# Patient Record
Sex: MALE | URBAN - METROPOLITAN AREA
[De-identification: names, ages, dates, MRNs, and addresses within clinical notes are randomized per-mention and may not be internally consistent; named-entity substitution may affect disease eponyms.]

---

## 2019-07-25 ENCOUNTER — EMERGENCY (EMERGENCY)
Facility: HOSPITAL | Age: 34
LOS: 1 days | Discharge: HOME | End: 2019-07-25
Attending: EMERGENCY MEDICINE
Payer: COMMERCIAL

## 2019-07-25 VITALS
HEIGHT: 70 IN | SYSTOLIC BLOOD PRESSURE: 122 MMHG | OXYGEN SATURATION: 99 % | DIASTOLIC BLOOD PRESSURE: 70 MMHG | HEART RATE: 72 BPM | RESPIRATION RATE: 20 BRPM | WEIGHT: 184.97 LBS | TEMPERATURE: 98 F

## 2019-07-25 LAB
BLOOD GAS SOURCE: SIGNIFICANT CHANGE UP
COHGB MFR BLDV: 2.7 % — HIGH (ref 0–2)
HGB BLD CALC-MCNC: 16.7 G/DL — SIGNIFICANT CHANGE UP (ref 13–17)

## 2019-07-25 PROCEDURE — 36415 COLL VENOUS BLD VENIPUNCTURE: CPT

## 2019-07-25 PROCEDURE — 82375 ASSAY CARBOXYHB QUANT: CPT

## 2019-07-25 PROCEDURE — 99283 EMERGENCY DEPT VISIT LOW MDM: CPT

## 2019-07-25 PROCEDURE — 99284 EMERGENCY DEPT VISIT MOD MDM: CPT

## 2019-07-25 NOTE — ED ADULT NURSE NOTE - OBJECTIVE STATEMENT
assumed pt care at 1345. Pt A&O x4. States he was fixing a smoker by his garage when he started to feel lightheaded, he grabbed carbon monoxide detector which started beeping so came to ED for eval. Pt still c/o dizziness/lightheadedness but denies any other symptoms at this time. No s/s of respiratory distress noted. Safety maintained. Will continue to monitor.

## 2019-07-25 NOTE — ED STATDOCS - CARE PLAN
Principal Discharge DX:	Carbon monoxide exposure major surgery, including arthroscopic and laparoscopic (greater than 1 hr)

## 2019-07-25 NOTE — ED STATDOCS - OBJECTIVE STATEMENT
32 y/o M pt with PMHx of asthma presents to the ED c/o dizziness. Presents feeling lightheaded and dizzy with minimal CP and SOB, presents due to possible CO exposure. Patient had a barbecue smoker in the garage and was standing by it when he began feeling lightheaded and short of breath. He then brought a CO monitor out to measure and the alarm went off. Pt went to urgent care and was told to come to ED, given minimal O2 at urgent care. Denies syncope, abd pain, HA.

## 2024-05-14 NOTE — ED STATDOCS - ATTESTATION, MLM
melissa seeds).        I have reviewed and confirmed nurses' notes for patient's medications, allergies, medical history, and surgical history.